# Patient Record
Sex: MALE | Race: WHITE | NOT HISPANIC OR LATINO | Employment: FULL TIME | URBAN - NONMETROPOLITAN AREA
[De-identification: names, ages, dates, MRNs, and addresses within clinical notes are randomized per-mention and may not be internally consistent; named-entity substitution may affect disease eponyms.]

---

## 2017-02-27 RX ORDER — OMEPRAZOLE 40 MG/1
CAPSULE, DELAYED RELEASE ORAL
Qty: 90 CAPSULE | Refills: 3 | Status: SHIPPED | OUTPATIENT
Start: 2017-02-27 | End: 2018-02-16 | Stop reason: SDUPTHER

## 2018-02-19 RX ORDER — OMEPRAZOLE 40 MG/1
CAPSULE, DELAYED RELEASE ORAL
Qty: 30 CAPSULE | Refills: 11 | Status: SHIPPED | OUTPATIENT
Start: 2018-02-19 | End: 2019-01-16

## 2019-01-16 RX ORDER — OMEPRAZOLE 40 MG/1
40 CAPSULE, DELAYED RELEASE ORAL DAILY
Qty: 30 CAPSULE | Refills: 11 | Status: SHIPPED | OUTPATIENT
Start: 2019-01-16 | End: 2020-01-15

## 2019-09-05 ENCOUNTER — APPOINTMENT (OUTPATIENT)
Dept: LAB | Facility: HOSPITAL | Age: 49
End: 2019-09-05
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
## 2019-09-05 DIAGNOSIS — Z00.00 GENERAL MEDICAL EXAM: Primary | ICD-10-CM

## 2019-09-05 DIAGNOSIS — R53.81 MALAISE AND FATIGUE: Primary | ICD-10-CM

## 2019-09-05 DIAGNOSIS — R53.83 MALAISE AND FATIGUE: Primary | ICD-10-CM

## 2019-09-05 PROCEDURE — 86618 LYME DISEASE ANTIBODY: CPT | Performed by: FAMILY MEDICINE

## 2019-09-05 PROCEDURE — 84443 ASSAY THYROID STIM HORMONE: CPT | Performed by: FAMILY MEDICINE

## 2019-09-05 PROCEDURE — 80053 COMPREHEN METABOLIC PANEL: CPT | Performed by: FAMILY MEDICINE

## 2019-09-05 PROCEDURE — 80061 LIPID PANEL: CPT | Performed by: FAMILY MEDICINE

## 2019-09-05 PROCEDURE — 86666 EHRLICHIA ANTIBODY: CPT | Performed by: FAMILY MEDICINE

## 2019-09-05 PROCEDURE — 85027 COMPLETE CBC AUTOMATED: CPT | Performed by: FAMILY MEDICINE

## 2019-09-05 PROCEDURE — 86757 RICKETTSIA ANTIBODY: CPT | Performed by: FAMILY MEDICINE

## 2019-09-05 RX ORDER — DOXYCYCLINE 100 MG/1
100 CAPSULE ORAL 2 TIMES DAILY
Qty: 30 CAPSULE | Refills: 0 | Status: SHIPPED | OUTPATIENT
Start: 2019-09-05 | End: 2019-10-03

## 2019-09-06 LAB
ALBUMIN SERPL-MCNC: 4.4 G/DL (ref 3.5–5.2)
ALBUMIN/GLOB SERPL: 1.6 G/DL
ALP SERPL-CCNC: 53 U/L (ref 39–117)
ALT SERPL W P-5'-P-CCNC: 20 U/L (ref 1–41)
ANION GAP SERPL CALCULATED.3IONS-SCNC: 12.9 MMOL/L (ref 5–15)
AST SERPL-CCNC: 23 U/L (ref 1–40)
BILIRUB SERPL-MCNC: 0.8 MG/DL (ref 0.2–1.2)
BUN BLD-MCNC: 13 MG/DL (ref 6–20)
BUN/CREAT SERPL: 15.7 (ref 7–25)
CALCIUM SPEC-SCNC: 9.3 MG/DL (ref 8.6–10.5)
CHLORIDE SERPL-SCNC: 104 MMOL/L (ref 98–107)
CHOLEST SERPL-MCNC: 160 MG/DL (ref 0–200)
CO2 SERPL-SCNC: 23.1 MMOL/L (ref 22–29)
CREAT BLD-MCNC: 0.83 MG/DL (ref 0.76–1.27)
DEPRECATED RDW RBC AUTO: 38.6 FL (ref 37–54)
ERYTHROCYTE [DISTWIDTH] IN BLOOD BY AUTOMATED COUNT: 12.6 % (ref 12.3–15.4)
GFR SERPL CREATININE-BSD FRML MDRD: 99 ML/MIN/1.73
GLOBULIN UR ELPH-MCNC: 2.7 GM/DL
GLUCOSE BLD-MCNC: 78 MG/DL (ref 65–99)
HCT VFR BLD AUTO: 51 % (ref 37.5–51)
HDLC SERPL-MCNC: 28 MG/DL (ref 40–60)
HGB BLD-MCNC: 17.1 G/DL (ref 13–17.7)
LDLC SERPL CALC-MCNC: 103 MG/DL (ref 0–100)
LDLC/HDLC SERPL: 3.66 {RATIO}
MCH RBC QN AUTO: 28.5 PG (ref 26.6–33)
MCHC RBC AUTO-ENTMCNC: 33.5 G/DL (ref 31.5–35.7)
MCV RBC AUTO: 85 FL (ref 79–97)
PLATELET # BLD AUTO: 195 10*3/MM3 (ref 140–450)
PMV BLD AUTO: 12.3 FL (ref 6–12)
POTASSIUM BLD-SCNC: 4.6 MMOL/L (ref 3.5–5.2)
PROT SERPL-MCNC: 7.1 G/DL (ref 6–8.5)
RBC # BLD AUTO: 6 10*6/MM3 (ref 4.14–5.8)
SODIUM BLD-SCNC: 140 MMOL/L (ref 136–145)
TRIGL SERPL-MCNC: 147 MG/DL (ref 0–150)
TSH SERPL DL<=0.05 MIU/L-ACNC: 1.51 UIU/ML (ref 0.27–4.2)
VLDLC SERPL-MCNC: 29.4 MG/DL (ref 5–40)
WBC NRBC COR # BLD: 6.77 10*3/MM3 (ref 3.4–10.8)

## 2019-09-07 LAB — B BURGDOR IGG+IGM SER-ACNC: <0.91 ISR (ref 0–0.9)

## 2019-09-09 DIAGNOSIS — R53.83 MALAISE AND FATIGUE: Primary | ICD-10-CM

## 2019-09-09 DIAGNOSIS — R53.81 MALAISE AND FATIGUE: Primary | ICD-10-CM

## 2019-09-09 PROCEDURE — 93010 ELECTROCARDIOGRAM REPORT: CPT | Performed by: INTERNAL MEDICINE

## 2019-09-10 ENCOUNTER — APPOINTMENT (OUTPATIENT)
Dept: GENERAL RADIOLOGY | Facility: HOSPITAL | Age: 49
End: 2019-09-10

## 2019-09-10 ENCOUNTER — APPOINTMENT (OUTPATIENT)
Dept: CT IMAGING | Facility: HOSPITAL | Age: 49
End: 2019-09-10

## 2019-09-10 ENCOUNTER — HOSPITAL ENCOUNTER (EMERGENCY)
Facility: HOSPITAL | Age: 49
Discharge: HOME | End: 2019-09-11
Attending: EMERGENCY MEDICINE | Admitting: EMERGENCY MEDICINE

## 2019-09-10 DIAGNOSIS — R07.9 CHEST PAIN, UNSPECIFIED TYPE: Primary | ICD-10-CM

## 2019-09-10 DIAGNOSIS — R42 DIZZINESS: ICD-10-CM

## 2019-09-10 LAB
ALBUMIN SERPL-MCNC: 4.2 G/DL (ref 3.5–5.2)
ALBUMIN/GLOB SERPL: 1.6 G/DL
ALP SERPL-CCNC: 53 U/L (ref 39–117)
ALT SERPL W P-5'-P-CCNC: 19 U/L (ref 1–41)
ANION GAP SERPL CALCULATED.3IONS-SCNC: 12 MMOL/L (ref 5–15)
AST SERPL-CCNC: 14 U/L (ref 1–40)
BASOPHILS # BLD AUTO: 0.05 10*3/MM3 (ref 0–0.2)
BASOPHILS NFR BLD AUTO: 0.5 % (ref 0–1.5)
BILIRUB SERPL-MCNC: 0.6 MG/DL (ref 0.2–1.2)
BUN BLD-MCNC: 20 MG/DL (ref 6–20)
BUN/CREAT SERPL: 21.5 (ref 7–25)
CALCIUM SPEC-SCNC: 9.6 MG/DL (ref 8.6–10.5)
CHLORIDE SERPL-SCNC: 103 MMOL/L (ref 98–107)
CO2 SERPL-SCNC: 25 MMOL/L (ref 22–29)
CREAT BLD-MCNC: 0.93 MG/DL (ref 0.76–1.27)
DEPRECATED RDW RBC AUTO: 37.8 FL (ref 37–54)
EOSINOPHIL # BLD AUTO: 0.07 10*3/MM3 (ref 0–0.4)
EOSINOPHIL NFR BLD AUTO: 0.7 % (ref 0.3–6.2)
ERYTHROCYTE [DISTWIDTH] IN BLOOD BY AUTOMATED COUNT: 12.9 % (ref 12.3–15.4)
GFR SERPL CREATININE-BSD FRML MDRD: 87 ML/MIN/1.73
GLOBULIN UR ELPH-MCNC: 2.7 GM/DL
GLUCOSE BLD-MCNC: 124 MG/DL (ref 65–99)
HCT VFR BLD AUTO: 44.8 % (ref 37.5–51)
HGB BLD-MCNC: 15.4 G/DL (ref 13–17.7)
IMM GRANULOCYTES # BLD AUTO: 0.02 10*3/MM3 (ref 0–0.05)
IMM GRANULOCYTES NFR BLD AUTO: 0.2 % (ref 0–0.5)
LYMPHOCYTES # BLD AUTO: 2.1 10*3/MM3 (ref 0.7–3.1)
LYMPHOCYTES NFR BLD AUTO: 21.3 % (ref 19.6–45.3)
MCH RBC QN AUTO: 27.9 PG (ref 26.6–33)
MCHC RBC AUTO-ENTMCNC: 34.4 G/DL (ref 31.5–35.7)
MCV RBC AUTO: 81.2 FL (ref 79–97)
MONOCYTES # BLD AUTO: 0.8 10*3/MM3 (ref 0.1–0.9)
MONOCYTES NFR BLD AUTO: 8.1 % (ref 5–12)
NEUTROPHILS # BLD AUTO: 6.81 10*3/MM3 (ref 1.7–7)
NEUTROPHILS NFR BLD AUTO: 69.2 % (ref 42.7–76)
NRBC BLD AUTO-RTO: 0 /100 WBC (ref 0–0.2)
NT-PROBNP SERPL-MCNC: 51.5 PG/ML (ref 5–450)
PLATELET # BLD AUTO: 219 10*3/MM3 (ref 140–450)
PMV BLD AUTO: 10.5 FL (ref 6–12)
POTASSIUM BLD-SCNC: 3.9 MMOL/L (ref 3.5–5.2)
PROT SERPL-MCNC: 6.9 G/DL (ref 6–8.5)
R RICKETTSI IGG SER QL IA: NEGATIVE
R RICKETTSI IGM TITR SER: 0.8 INDEX (ref 0–0.89)
RBC # BLD AUTO: 5.52 10*6/MM3 (ref 4.14–5.8)
SODIUM BLD-SCNC: 140 MMOL/L (ref 136–145)
TROPONIN T SERPL-MCNC: <0.01 NG/ML (ref 0–0.03)
WBC NRBC COR # BLD: 9.85 10*3/MM3 (ref 3.4–10.8)

## 2019-09-10 PROCEDURE — 93005 ELECTROCARDIOGRAM TRACING: CPT | Performed by: EMERGENCY MEDICINE

## 2019-09-10 PROCEDURE — 85379 FIBRIN DEGRADATION QUANT: CPT | Performed by: EMERGENCY MEDICINE

## 2019-09-10 PROCEDURE — 84484 ASSAY OF TROPONIN QUANT: CPT | Performed by: EMERGENCY MEDICINE

## 2019-09-10 PROCEDURE — 71046 X-RAY EXAM CHEST 2 VIEWS: CPT

## 2019-09-10 PROCEDURE — 85025 COMPLETE CBC W/AUTO DIFF WBC: CPT | Performed by: EMERGENCY MEDICINE

## 2019-09-10 PROCEDURE — 83880 ASSAY OF NATRIURETIC PEPTIDE: CPT | Performed by: EMERGENCY MEDICINE

## 2019-09-10 PROCEDURE — 70450 CT HEAD/BRAIN W/O DYE: CPT

## 2019-09-10 PROCEDURE — 80053 COMPREHEN METABOLIC PANEL: CPT | Performed by: EMERGENCY MEDICINE

## 2019-09-10 PROCEDURE — 99284 EMERGENCY DEPT VISIT MOD MDM: CPT

## 2019-09-10 PROCEDURE — 93010 ELECTROCARDIOGRAM REPORT: CPT | Performed by: INTERNAL MEDICINE

## 2019-09-10 RX ORDER — SODIUM CHLORIDE 0.9 % (FLUSH) 0.9 %
10 SYRINGE (ML) INJECTION AS NEEDED
Status: DISCONTINUED | OUTPATIENT
Start: 2019-09-10 | End: 2019-09-11 | Stop reason: HOSPADM

## 2019-09-11 VITALS
TEMPERATURE: 97.8 F | HEART RATE: 60 BPM | OXYGEN SATURATION: 97 % | BODY MASS INDEX: 28.23 KG/M2 | WEIGHT: 220 LBS | DIASTOLIC BLOOD PRESSURE: 79 MMHG | SYSTOLIC BLOOD PRESSURE: 139 MMHG | HEIGHT: 74 IN | RESPIRATION RATE: 18 BRPM

## 2019-09-11 LAB
A PHAGOCYTOPH IGM TITR SER IF: NEGATIVE {TITER}
CONV HGE IGG TITER: NEGATIVE
D-DIMER, QUANTITATIVE (MAD,POW, STR): 380 NG/ML (FEU) (ref 0–470)
E CHAFFEENSIS IGG TITR SER IF: NEGATIVE {TITER}
E. CHAFFEENSIS (HME) IGM TITER: NEGATIVE
HOLD SPECIMEN: NORMAL
HOLD SPECIMEN: NORMAL
WHOLE BLOOD HOLD SPECIMEN: NORMAL
WHOLE BLOOD HOLD SPECIMEN: NORMAL

## 2019-09-11 NOTE — DISCHARGE INSTRUCTIONS
Follow-up with Dr. Rogers or Dr. Richardson for further evaluation management and cardiac stress testing.  Continue current medications.  Continue an aspirin daily.  Return with any new or worsening symptoms or any concerns.

## 2019-09-11 NOTE — ED PROVIDER NOTES
Subjective   Patient 49-year-old male comes in today with complaints of chest tightness heaviness and some dizziness.  Patient states he was driving and he felt like his vision was getting tunnel in nature lasting seconds.  Patient had some lightheaded dizziness denied to while watching TV.  Patient has chest pain in the midline the right side of his chest going up to his neck.  Patient states he has had the symptoms for 5 days, has been seen by his primary care doctor, Dr. Quintanilla who told him to come immediately to the emergency department should the symptoms get worse.  So patient presented tonight for the symptoms for evaluation.  Patient has had no nausea vomiting.  There is been no shortness of breath.  Patient is a long-term smoker.  Patient had cardiac work-up approximately 3 years ago.  There is no history of aneurysm, his past medical history is incorrect in this nature.  CT scan, his latest showed no evidence of aneurysm.  No recent fevers or chills.  There is some question of tickborne illness for which the patient is currently on doxycycline.            Review of Systems   Constitutional: Positive for fatigue. Negative for appetite change, chills and fever.   HENT: Negative.  Negative for congestion.    Eyes: Positive for visual disturbance. Negative for photophobia.   Respiratory: Positive for shortness of breath. Negative for cough and chest tightness.    Cardiovascular: Positive for chest pain. Negative for palpitations.   Gastrointestinal: Negative.  Negative for abdominal pain, constipation, diarrhea, nausea and vomiting.   Endocrine: Negative.    Genitourinary: Negative.  Negative for decreased urine volume, dysuria, flank pain and hematuria.   Musculoskeletal: Negative.  Negative for arthralgias, back pain, myalgias, neck pain and neck stiffness.   Skin: Negative.  Negative for pallor.   Neurological: Positive for dizziness. Negative for syncope, weakness, light-headedness, numbness and headaches.    Psychiatric/Behavioral: Negative.  Negative for confusion and suicidal ideas. The patient is not nervous/anxious.    All other systems reviewed and are negative.      Past Medical History:   Diagnosis Date   • Aneurysm of thoracic aorta (CMS/HCC)    • Bicuspid aortic valve     unable to exclude      • Chest pain    • GERD (gastroesophageal reflux disease)    • Heavy tobacco smoker    • Screening for hyperlipidemia        No Known Allergies    Past Surgical History:   Procedure Laterality Date   • KIDNEY STONE SURGERY         History reviewed. No pertinent family history.    Social History     Socioeconomic History   • Marital status:      Spouse name: Not on file   • Number of children: Not on file   • Years of education: Not on file   • Highest education level: Not on file   Tobacco Use   • Smoking status: Current Every Day Smoker     Packs/day: 1.50     Types: Cigarettes   Substance and Sexual Activity   • Alcohol use: No     Frequency: Never   • Drug use: No   • Sexual activity: Defer           Objective   Physical Exam   Constitutional: He is oriented to person, place, and time. He appears well-developed and well-nourished.  Non-toxic appearance. He does not appear ill. No distress.   HENT:   Head: Normocephalic and atraumatic.   Eyes: Conjunctivae and EOM are normal.   Neck: Normal range of motion. Neck supple. No JVD present.   No meningismus.  No bruit.   Cardiovascular: Normal rate, regular rhythm, normal heart sounds and intact distal pulses. Exam reveals no gallop and no friction rub.   No murmur heard.  Pulmonary/Chest: Effort normal. No respiratory distress. He has no wheezes. He has no rales. He exhibits no tenderness.   Abdominal: Soft. Bowel sounds are normal. He exhibits no distension and no mass. There is no tenderness. There is no rebound and no guarding.   Musculoskeletal: Normal range of motion.   Lymphadenopathy:     He has no cervical adenopathy.   Neurological: He is alert and  oriented to person, place, and time.   Skin: Skin is warm and dry. Capillary refill takes less than 2 seconds.   Psychiatric: He has a normal mood and affect. His behavior is normal. Judgment and thought content normal.   Nursing note and vitals reviewed.      ECG 12 Lead    Date/Time: 9/10/2019 10:51 PM  Performed by: Emmanuel Cervantes MD  Authorized by: Emmanuel Cervantes MD   Interpreted by physician  Comparison: compared with previous ECG   Rhythm: sinus rhythm  Rate: normal  BPM: 77  QRS axis: normal  Conduction: conduction normal  Clinical impression: normal ECG                 ED Course      Labs Reviewed   COMPREHENSIVE METABOLIC PANEL - Abnormal; Notable for the following components:       Result Value    Glucose 124 (*)     All other components within normal limits    Narrative:     GFR Normal >60  Chronic Kidney Disease <60  Kidney Failure <15   BNP (IN-HOUSE) - Normal    Narrative:     Among patients with dyspnea, NT-proBNP is highly sensitive for the detection of acute congestive heart failure. In addition NT-proBNP of <300 pg/ml effectively rules out acute congestive heart failure with 99% negative predictive value.   TROPONIN (IN-HOUSE) - Normal    Narrative:     Troponin T Reference Range:  <= 0.03 ng/mL-   Negative for AMI  >0.03 ng/mL-     Abnormal for myocardial necrosis.  Clinicians would have to utilize clinical acumen, EKG, Troponin and serial changes to determine if it is an Acute Myocardial Infarction or myocardial injury due to an underlying chronic condition.    CBC WITH AUTO DIFFERENTIAL - Normal   D-DIMER, QUANTITATIVE - Normal    Narrative:     Dimer values <500 ng/ml FEU are FDA approved as aid in diagnosis of deep venous thrombosis and pulmonary embolism.  This test should not be used in an exclusion strategy with pretest probability alone.    A recent guideline regarding diagnosis for pulmonary thromboembolism recommends an adjusted exclusion criterion of age x 10 ng/ml FEU for  patients >50 years of age ([employer] Intern Med 2015; 163: 701-711).   RAINBOW DRAW    Narrative:     The following orders were created for panel order [place] Draw.  Procedure                               Abnormality         Status                     ---------                               -----------         ------                     Light Blue Top[[id number]]                                   Final result               Green Top (Gel)[[id number]]                                  Final result               Lavender Top[[id number]]                                     Final result               Gold Top - SST[[id number]]                                   Final result                 Please view results for these tests on the individual orders.   CBC AND DIFFERENTIAL    Narrative:     The following orders were created for panel order CBC & Differential.  Procedure                               Abnormality         Status                     ---------                               -----------         ------                     CBC Auto Differential[[id number]]        Normal              Final result                 Please view results for these tests on the individual orders.   LIGHT BLUE TOP   GREEN TOP   LAVENDER TOP   GOLD TOP - SST       CT Head Without Contrast   Final Result   CONCLUSION:   Normal nonenhanced CT of the brain      [id number]      Electronically signed by:  Farhad Richards MD  9/11/2019 12:06 AM   CDT Workstation: [employer]      XR Chest 2 View   Final Result   CONCLUSION:   No Acute Disease      [id number]      Electronically signed by:  Farhad Richards MD  9/11/2019 12:00 AM   CDT Workstation: [employer]        Patient with nonspecific chest pain over last 2 weeks.  Patient with dizziness this evening.  Negative markers with normal EKG negative CT scan.  No objective findings at this time.  Patient will follow with a stress test with cardiology.  Referral given for the same.  Plan scheduling tomorrow.             [facility]    Final diagnoses:   Chest pain, unspecified type   Dizziness              Emmanuel Cervantes MD  09/11/19 0051

## 2019-09-13 PROBLEM — R07.2 PRECORDIAL CHEST PAIN: Status: ACTIVE | Noted: 2019-09-13

## 2019-09-13 NOTE — PROGRESS NOTES
"Subjective:     Chest Pain (chief complaint)    Patient Care Team:  Brian Quintanilla MD as PCP - General    History of Present Illness  The patient is a 49-year-old male who presents as referral from Emmanuel Cervantes MD following presentation to [facility] emergency department on 9/10/2019 regarding complaints of chest discomfort/dizziness which is described as below.    During the course of emergency department, the patient underwent EKG, troponins (negative), CXR and CT head all of which were unremarkable.     Chest Pain:  Location: Anterior chest radiating into the LUE  Duration: Intermittent over \"several weeks\"  Quality: Dull ache  Intensity: Moderate  Precipitating factors: None identified  Aggravating factors: None identified  Alleviating factors: None identififed  Associated symptoms: Near syncope; dizziness; dyspnea    Dizziness/near syncope has occurred intermittently over this same period of time, while walking, position changes and driving. He describes this as an \"off balanced\" \"feel like I'm going to pass out\" feeling.     The patient was last evaluated in cardiology per Dr. Rogers on 2/11/2016 regarding noncardiac chest pain following a low risk study treadmill stress test.  In addition TAA was excluded by CT as well as no evidence of bicuspid aortic valve with transthoracic echocardiogram.  The latest limited transthoracic echocardiogram as well as stress test has been forwarded to medical records for scanning.    Review of Systems   Constitution: Positive for malaise/fatigue.   Cardiovascular: Positive for chest pain, dyspnea on exertion and near-syncope. Negative for claudication, cyanosis, irregular heartbeat, leg swelling, orthopnea, palpitations, paroxysmal nocturnal dyspnea and syncope.   Musculoskeletal: Negative for falls.   Gastrointestinal: Negative for bloating.   Neurological: Positive for dizziness and light-headedness.     Past Medical History:   Diagnosis Date   • " "Chest pain    • GERD (gastroesophageal reflux disease)    • Heavy tobacco smoker    • Screening for hyperlipidemia    ,   Past Surgical History:   Procedure Laterality Date   • KIDNEY STONE SURGERY     ,   Family History   Problem Relation Age of Onset   • Hypertension Father    ,   Social History     Socioeconomic History   • Marital status:      Spouse name: Not on file   • Number of children: Not on file   • Years of education: Not on file   • Highest education level: Not on file   Tobacco Use   • Smoking status: Current Every Day Smoker     Packs/day: 1.50     Types: Cigarettes   • Smokeless tobacco: Never Used   Substance and Sexual Activity   • Alcohol use: No     Frequency: Never   • Drug use: No   • Sexual activity: Defer     Patient has no known allergies.    Current Outpatient Medications   Medication Sig Dispense Refill   • aspirin 325 MG tablet Take 325 mg by mouth Every Night.     • omeprazole (priLOSEC) 40 MG capsule Take 1 capsule by mouth Daily. 30 capsule 11   • doxycycline (MONODOX) 100 MG capsule Take 1 capsule by mouth 2 (Two) Times a Day. 30 capsule 0     No current facility-administered medications for this visit.      Objective:     Vitals:    09/16/19 0903   BP: 122/74   BP Location: Left arm   Patient Position: Sitting   Cuff Size: Adult   Pulse: 77   SpO2: 98%   Weight: 97.2 kg (214 lb 3.2 oz)   Height: 188 cm (74\")     Wt Readings from Last 3 Encounters:   09/16/19 97.2 kg (214 lb 3.2 oz)   09/10/19 99.8 kg (220 lb)       Physical Exam   Constitutional: He is oriented to person, place, and time. He appears well-developed and well-nourished. No distress.   HENT:   Head: Normocephalic and atraumatic.   Eyes: No scleral icterus.   Neck: No JVD present.   Cardiovascular: Normal rate, regular rhythm, S1 normal, S2 normal, normal heart sounds and intact distal pulses. Exam reveals no gallop.   No murmur heard.  Pulses:       Carotid pulses are on the right side with bruit.       Radial " pulses are 2+ on the right side, and 2+ on the left side.   Pulmonary/Chest: Effort normal and breath sounds normal. No respiratory distress. He has no wheezes. He has no rales.   Abdominal: Soft. He exhibits no distension. There is no tenderness.   Musculoskeletal: He exhibits no edema or tenderness.   Neurological: He is alert and oriented to person, place, and time.   Skin: Skin is warm and dry. He is not diaphoretic.   Psychiatric: He has a normal mood and affect. His behavior is normal. Judgment and thought content normal.   Vitals reviewed.    Data Reviewed:  Electrocardiogram:       @  Results for orders placed in visit on 07/07/15   Echo - Converted    [facility]      PT NAME:  JEANETH KENDRICK  [facility]  MR NUMBER:  [id number]                      :  1970  ECHOCARDIOGRAM        MEDICAL OFFICE BUILDING  DATE:  2015     PRIMARY CARE PHYSICIAN:    ORDERING DOCTOR:                             MANISH MCCALL MD     REASON FOR ECHO:  Chest    INTERPRETING  pain                       CARDIOLOGIST:  MANISH MCCALL MD     PRIOR ECHO:  No            DATE OF PRIOR ECHO:     PATIENT PROCEDURE #:       LOCATION OF STUDY:  H                       and V                               QUANTITATIVE MEASUREMENTS     LA =    34     (<40)     LA/AO Ratio  0.8  Ao =    40     (<37)     AoV Open     21   (>15)  IVS=    11     (<11)     LVPW         10   (<11)  LVEDd=  51     (38-56)   LVEDs        36   (22-40)  RV=     24     (<26)     LVOT         2.3  EF=     55-60  %         %F.S.             (24-46)     Max. Pulmonary Gradient  4   mmHg  RV Systolic Pressure     17  mmHg     MV Area           4.7  cm2  Mean MV Gradient  2    mmHg  MV p 1/2 t        47   msec     EMILY (Doppler)     3.0  cm2  EMILY (Planimeter)       cm2  Max. AV Gradient  8    mmHg  Mean AV Gradient  3    mmHg  AI p 1/2 t             msec        DESCRIPTION:  A routine full  transthoracic echocardiogram was performed  with color flow Doppler. The study is technically adequate.     FINDINGS:  The left ventricle is normal in internal size and shape with  normal function. Ejection fraction 60%. The left ventricular wall  motion is normal.     The left ventricle is normal in thickness.       Diastolic function: E/A 0.7, E 54 cm/sec, E prime 12 cm/sec. Grade 1A  diastolic dysfunction.     The right ventricle is normal in size and shape with normal systolic  function. Interventricular septum is normal. No evidence of right  ventricular pressure or volume overload.     The left and right atrium are normal in diameter. Interatrial septum is  intact by color flow Doppler.     Left ventricular outflow tract, sinuses of Valsalva, sinotubular  junction, and tubular ascending aorta measured normal in size on 2D  echo. However, M-mode echo through the aortic root reveals dilation at  4 cm. Suprasternal views of the arch were obtained and were  satisfactory. Mid arch normal in size without abnormality in color flow  Doppler to suggest coarctation. Normal early diastolic flow reversal.     The right ventricle outflow tract is normal in diameter.     The mitral valve is structurally normal.     The aortic valve appears to be normal in the short axis views. It is  difficult to exclude a bicuspid aortic valve. There is normal leaflet  excursion.     Tricuspid valve is normal. Trace tricuspid regurgitation. Pulmonary  artery systolic pressure 17 mmHg.     Pulmonic valve is not well seen.     The IVC is normal in size with appropriate collapse showing right  atrial pressure is 0-5 mmHg.     Pericardium: No pericardial effusion.     CONCLUSIONS:  1. Normal left ventricular function with an estimated ejection fraction        of 55% to 60% without regional wall motion abnormalities.  2. Normal right ventricular size with normal function.  3. Grade 1A diastolic dysfunction.  4. Unable to exclude bicuspid aortic  valve.  5. Dilated aortic root at 4 cm.  6. No significant valvular regurgitation or stenosis.        MANISH MCCALL MD  Electronically Signed  07/09/2015 16:16:19  By MANISH MCCALL MD     SH/cla  Dictated:   07/09/2015 1229  Transcribed:   07/09/2015 1446     Page #page     Xr Chest 2 View    Result Date: 9/11/2019  CONCLUSION: No Acute Disease 99051 Electronically signed by:  Farhad Richards MD  9/11/2019 12:00 AM CDT Workstation: [employer]    Ct Head Without Contrast    Result Date: 9/11/2019  CONCLUSION: Normal nonenhanced CT of the brain [id number] Electronically signed by:  Farhad Richards MD  9/11/2019 12:06 AM CDT Workstation: [employer]    Labs:  Results from last 7 days   Lab Units 09/10/19  2318   WBC 10*3/mm3 9.85   HEMOGLOBIN g/dL 15.4   PLATELETS 10*3/mm3 219     Lab Results   Component Value Date    GLUCOSE 124 (H) 09/10/2019    CALCIUM 9.6 09/10/2019     09/10/2019    K 3.9 09/10/2019    CO2 25.0 09/10/2019     09/10/2019    BUN 20 09/10/2019    CREATININE 0.93 09/10/2019    EGFRIFNONA 87 09/10/2019    BCR 21.5 09/10/2019    ANIONGAP 12.0 09/10/2019     Lab Results   Component Value Date    ALT 19 09/10/2019     Estimated Creatinine Clearance: 133.5 mL/min (by C-G formula based on SCr of 0.93 mg/dL).    Lab Results   Component Value Date    CHOL 160 09/05/2019    CHLPL 175 07/01/2015    TRIG 147 09/05/2019    HDL 28 (L) 09/05/2019     (H) 09/05/2019     Lab Results   Component Value Date    TSH 1.510 09/05/2019     Lab Results   Component Value Date    PROBNP 51.5 09/10/2019     The following portions of the patient's history were reviewed and updated as appropriate: allergies, current medications, problem list,  past medical history, surgical history, family history and social history.    Recent images independently reviewed.    Available laboratory values reviewed.      Assessment:      Diagnosis Plan   1. Precordial chest pain  Adult Transthoracic Echo Complete W/ Cont if  Necessary Per Protocol    Treadmill Stress Test   2. Dizziness  Tilt Table    Holter Monitor - 72 Hour Up To 21 Days   3. Near syncope  Tilt Table    Holter Monitor - 72 Hour Up To 21 Days   4. Dyspnea on exertion  Adult Transthoracic Echo Complete W/ Cont if Necessary Per Protocol   5. Bruit of right carotid artery  Duplex Carotid Ultrasound CAR     The patient has chest pain syndrome with pain complaints that are best characterized as typical.   The patient is moderate risk.    An ischemia evaluation is indicated.    The patient is able to exercise.    EKG is normal.  Risks/Benefits discussed.   A hand out was provided on the type of stress test and how to handle additional chest pain complaints. Questions answered.   I have asked the patient to call 911 or be seen in the ED for further CP complaints.   Will plan on further evaluation with:  TST  TTE    In addition the patient presents with dizziness/near syncopal episodes for which underlying neurocardiogenic syncope/arrhythmias cannot entirely be ruled out.  We will plan on further evaluation with:  Head up tilt table  Holter monitor    There were clinical findings suggestive of right carotid bruit therefore we will proceed with:  Duplex carotid ultrasound    Future Appointments       Provider Department Center    9/26/2019 1:00 PM MGW HVC CARD STRESS [facility] [place]    9/26/2019 2:00 PM MAD C ECHO ROOM 1 [facility]ART [facility] [place]    9/26/2019 2:45 PM EKG/HOLTER CARD [facility] HEART AND [facility] [place]    10/3/2019 11:30 AM MAD HVC VAS ROOM [facility] [place]    10/3/2019 1:00 PM MAD STRESS LAB 1 [facility] CARDIO STRESS [facility]    10/21/2019 3:30 PM Gwen Canada APRN [facility] CARDIOLOGY             This document has been electronically signed by VICKIE Holcomb on September 16,  2019 10:23 AM

## 2019-09-16 ENCOUNTER — OFFICE VISIT (OUTPATIENT)
Dept: CARDIOLOGY | Facility: CLINIC | Age: 49
End: 2019-09-16

## 2019-09-16 VITALS
DIASTOLIC BLOOD PRESSURE: 74 MMHG | SYSTOLIC BLOOD PRESSURE: 122 MMHG | OXYGEN SATURATION: 98 % | HEART RATE: 77 BPM | WEIGHT: 214.2 LBS | BODY MASS INDEX: 27.49 KG/M2 | HEIGHT: 74 IN

## 2019-09-16 DIAGNOSIS — R06.09 DYSPNEA ON EXERTION: ICD-10-CM

## 2019-09-16 DIAGNOSIS — R09.89 BRUIT OF RIGHT CAROTID ARTERY: ICD-10-CM

## 2019-09-16 DIAGNOSIS — R42 DIZZINESS: ICD-10-CM

## 2019-09-16 DIAGNOSIS — R07.2 PRECORDIAL CHEST PAIN: Primary | ICD-10-CM

## 2019-09-16 DIAGNOSIS — R55 NEAR SYNCOPE: ICD-10-CM

## 2019-09-16 PROCEDURE — 99215 OFFICE O/P EST HI 40 MIN: CPT | Performed by: NURSE PRACTITIONER

## 2019-09-16 RX ORDER — ASPIRIN 325 MG
325 TABLET ORAL NIGHTLY
COMMUNITY

## 2019-10-03 ENCOUNTER — HOSPITAL ENCOUNTER (OUTPATIENT)
Dept: CARDIOLOGY | Facility: HOSPITAL | Age: 49
Discharge: HOME | End: 2019-10-03
Admitting: NURSE PRACTITIONER

## 2019-10-03 DIAGNOSIS — R55 NEAR SYNCOPE: ICD-10-CM

## 2019-10-03 DIAGNOSIS — R42 DIZZINESS: ICD-10-CM

## 2019-10-03 LAB
MAXIMAL PREDICTED HEART RATE: 172 BPM
STRESS TARGET HR: 146 BPM

## 2019-10-03 PROCEDURE — 93660 TILT TABLE EVALUATION: CPT | Performed by: INTERNAL MEDICINE

## 2019-10-03 PROCEDURE — 93660 TILT TABLE EVALUATION: CPT

## 2019-10-14 DIAGNOSIS — R42 DIZZINESS: Primary | ICD-10-CM

## 2019-10-18 NOTE — PROGRESS NOTES
"Subjective:     Results    Patient Care Team:  Brian Quintanilla MD as PCP - General    History of Present Illness  The patient is a 49-year-old male who presents as referral from Emmanuel Cervantes MD following presentation to [facility] emergency department on 9/10/2019 regarding complaints of chest discomfort/dizziness which is described as below.    During the course of emergency department, the patient underwent EKG, troponins (negative), CXR and CT head all of which were unremarkable.     Chest Pain:  Location: Anterior chest radiating into the LUE  Duration: Intermittent over \"several weeks\"  Quality: Dull ache  Intensity: Moderate  Precipitating factors: None identified  Aggravating factors: None identified  Alleviating factors: None identififed  Associated symptoms: Near syncope; dizziness; dyspnea    Dizziness/near syncope has occurred intermittently over this same period of time, while walking, position changes and driving. He describes this as an \"off balanced\" \"feel like I'm going to pass out\" feeling.     The patient was last evaluated in cardiology per Dr. Rogers on 2/11/2016 regarding noncardiac chest pain following a low risk study treadmill stress test.  In addition TAA was excluded by CT as well as no evidence of bicuspid aortic valve with transthoracic echocardiogram.  The latest limited transthoracic echocardiogram as well as stress test has been forwarded to medical records for scanning.    10/21/2019  The patient returns to the office today for discussion of procedure results.  The patient indicates that he has an appointment with a neurologist in [place] as arranged by his primary care provider regarding his dizziness.  The patient is unaware of any symptoms related to snoring, sleep disturbances or daytime sleepiness.    Review of Systems   Constitution: Positive for malaise/fatigue.   Cardiovascular: Positive for chest pain, dyspnea on exertion and near-syncope. Negative " "for claudication, cyanosis, irregular heartbeat, leg swelling, orthopnea, palpitations, paroxysmal nocturnal dyspnea and syncope.   Respiratory: Negative for sleep disturbances due to breathing and snoring.    Musculoskeletal: Negative for falls.   Gastrointestinal: Negative for bloating.   Neurological: Positive for dizziness and light-headedness. Negative for excessive daytime sleepiness.     Past Medical History:   Diagnosis Date   • Chest pain    • GERD (gastroesophageal reflux disease)    • Heavy tobacco smoker    • Screening for hyperlipidemia    ,   Past Surgical History:   Procedure Laterality Date   • KIDNEY STONE SURGERY     ,   Family History   Problem Relation Age of Onset   • Hypertension Father    ,   Social History     Socioeconomic History   • Marital status:      Spouse name: Not on file   • Number of children: Not on file   • Years of education: Not on file   • Highest education level: Not on file   Tobacco Use   • Smoking status: Current Every Day Smoker     Packs/day: 1.50     Types: Cigarettes   • Smokeless tobacco: Never Used   Substance and Sexual Activity   • Alcohol use: No     Frequency: Never   • Drug use: No   • Sexual activity: Defer     Patient has no known allergies.    Current Outpatient Medications   Medication Sig Dispense Refill   • aspirin 325 MG tablet Take 325 mg by mouth Every Night.     • omeprazole (priLOSEC) 40 MG capsule Take 1 capsule by mouth Daily. 30 capsule 11     No current facility-administered medications for this visit.      Objective:     Vitals:    10/21/19 1512   BP: 128/80   BP Location: Left arm   Patient Position: Sitting   Cuff Size: Adult   Pulse: 77   SpO2: 99%   Weight: 94.3 kg (207 lb 12.8 oz)   Height: 188 cm (74\")     Wt Readings from Last 3 Encounters:   10/21/19 94.3 kg (207 lb 12.8 oz)   09/16/19 97.2 kg (214 lb 3.2 oz)   09/10/19 99.8 kg (220 lb)       Physical Exam   Constitutional: He is oriented to person, place, and time. He appears " well-developed and well-nourished. No distress.   HENT:   Head: Normocephalic and atraumatic.   Eyes: No scleral icterus.   Neck: No JVD present.   Cardiovascular: Normal rate, regular rhythm, S1 normal, S2 normal, normal heart sounds and intact distal pulses. Exam reveals no gallop.   No murmur heard.  Pulses:       Carotid pulses are on the right side with bruit.       Radial pulses are 2+ on the right side, and 2+ on the left side.   Pulmonary/Chest: Effort normal and breath sounds normal. No respiratory distress. He has no wheezes. He has no rales.   Abdominal: Soft. He exhibits no distension. There is no tenderness.   Musculoskeletal: He exhibits no edema or tenderness.   Neurological: He is alert and oriented to person, place, and time.   Skin: Skin is warm and dry. He is not diaphoretic.   Psychiatric: He has a normal mood and affect. His behavior is normal. Judgment and thought content normal.   Vitals reviewed.    Data Reviewed:  Electrocardiogram:       TST: 09/26/2019  Interpretation Summary   · The patient reached the end of the protocol and achieved the target heart rate.  · The patient experienced no angina during the stress test.  · Blood pressure demonstrated a normal response to stress. Heart rate demonstrated a normal response to stress. Overall, the patient's exercise capacity was normal  · The Duke Treadmill Score of 9 is consistent with a Low risk for ischemic heart disease.  · Findings consistent with a normal ECG stress test.        @  Results for orders placed during the hospital encounter of 09/26/19   Adult Transthoracic Echo Complete W/ Cont if Necessary Per Protocol    Narrative · Left ventricular wall thickness is consistent with mild concentric   hypertrophy.  · Estimated EF = 58%.  · Left ventricular systolic function is normal.  · Left ventricular diastolic dysfunction (grade I a) consistent with   impaired relaxation.  · Right ventricular cavity is borderline dilated. Normal systolic  function  · Mild dilation of the aortic root is present (4.0 cms)        ANAT: 10/03/2019  Interpretation Summary   Ambrose Sherman is a 49-year-old male who is being evaluated for dizziness  Procedure: Tilt table testing     The patient was tilted for a period of 46 minutes and 39 seconds at 70 degrees.  The resting heart rate was 77 bpm and the maximum heart rate noted was 96 bpm.  There were no episodes of bradycardia noted.  Baseline blood pressure was 123/79 and no episodes of symptomatic hypotension was noted.   EKG showed sinus rhythm with no cardiac arrhythmias noted.  No ST-T changes noted.      Impression: Negative tilt table test for cardiogenic syncope     Carotid Ulsd: 10/03/2019  Study Impression   • Right ICA Prox: Imaging of the right ICA indicates 0-49% stenosis.  • Right Vertebral: Antegrade flow is present.     • Left ICA Prox: Imaging of the left ICA indicates 0-49% stenosis.  • Left Vertebral: Antegrade flow present.     09/10/2019      Labs:      Lab Results   Component Value Date    GLUCOSE 124 (H) 09/10/2019    CALCIUM 9.6 09/10/2019     09/10/2019    K 3.9 09/10/2019    CO2 25.0 09/10/2019     09/10/2019    BUN 20 09/10/2019    CREATININE 0.93 09/10/2019    EGFRIFNONA 87 09/10/2019    BCR 21.5 09/10/2019    ANIONGAP 12.0 09/10/2019     Lab Results   Component Value Date    ALT 19 09/10/2019     CrCl cannot be calculated (Patient's most recent lab result is older than the maximum 30 days allowed.).    Lab Results   Component Value Date    CHOL 160 09/05/2019    CHLPL 175 07/01/2015    TRIG 147 09/05/2019    HDL 28 (L) 09/05/2019     (H) 09/05/2019     Lab Results   Component Value Date    TSH 1.510 09/05/2019     Lab Results   Component Value Date    PROBNP 51.5 09/10/2019     The following portions of the patient's history were reviewed and updated as appropriate: allergies, current medications, problem list,  past medical history, surgical history, family history and social  history.    Recent images independently reviewed.    Available laboratory values reviewed.    Assessment:      Diagnosis Plan   1. Chest pain syndrome     2. Postural dizziness with near syncope     3. Dilated aortic root (CMS/HCC)  CT Angiogram Chest With & Without Contrast     Discussion with the patient regarding results of treadmill stress test, transthoracic echocardiogram and head up tilt table for which he indicates information has been provided in a fashion of his understanding.  There has been a preliminary review of the Zio patch results per Dr. Dumont today (no formal interpretation had been acquired prior to today for reasons that have not been given).  Dr. Dumont indicates the presence of nocturnal bradycardia, episode of tachycardia, and the presence of premature atrial contractions (less than 1% burden).  Dr. Dumont indicates that there is no evidence of sustained arrhythmias of concern.    At this time, we will proceed with CT of the chest regarding dilated aortic root.  I will contact the patient by phone regarding results.    I have asked the patient to speak with his wife regarding significant snoring and sleep disturbances due to abnormal breathing pattern.  The patient or his wife will contact me regarding their discussion.  Consideration has been given to referral to sleep medicine.    Additional recommendations:  Activity: Approximately 150 minutes of moderate activity (such as walking program)  per week (20-30 minutes most days of the week)    Diet: Heart healthy foods - more fresh fruits and vegetables and whole grains; less red meat; more fish and poultry that is baked or grilled - not fried; less salt not to exceed 2000 mg daily - less processed food; No trans or saturated fat    Non Smoker    Recommend caffeine intake to no more than 400 mg/day with intake no later than 1 hour after lunch    Alcohol intake:   Up to 2 servings per day for men and 1 serving per day in women or  lightweight men     A standard drink is equal to 14.0 grams (0.6 ounces) of pure alcohol. Generally, this    amount of pure alcohol is found in  12 ounces of beer (5% alcohol content)   8 ounces of malt liquor (7% alcohol content)  5 ounces of wine (12% alcohol content)  1.5 ounces or a “shot” of 80-proof (40% alcohol content) distilled spirits or liquor (e.g., gin, rum, vodka, whiskey)    Weight management: Patient's Body mass index is 26.68 kg/m². BMI is above normal parameters. Recommendations include: educational material, exercise counseling and nutrition counseling  Stress management    *Disease risk for type 2 diabetes. Hypertension and cardiovascular disease discussed with the patient  * Increased waist circumference (greater than 35 inches for females and 40 inches for males) also can be a marker for increased risk, even in persons of normal weight - patient made aware of this information    20 minutes out of 30 minutes face to face spent in counseling/coordination of care as relates to presentation of chest pain syndrome with dietary and lifestyle modifications/procedures as outlined above.          Future Appointments       Provider Department Center    10/29/2019 2:30 PM ABEBA CT 1 [facility]        This document has been electronically signed by VICKIE Holcomb on October 21, 2019 3:31 PM

## 2019-10-21 ENCOUNTER — OFFICE VISIT (OUTPATIENT)
Dept: CARDIOLOGY | Facility: CLINIC | Age: 49
End: 2019-10-21

## 2019-10-21 VITALS
SYSTOLIC BLOOD PRESSURE: 128 MMHG | DIASTOLIC BLOOD PRESSURE: 80 MMHG | BODY MASS INDEX: 26.67 KG/M2 | OXYGEN SATURATION: 99 % | HEIGHT: 74 IN | HEART RATE: 77 BPM | WEIGHT: 207.8 LBS

## 2019-10-21 DIAGNOSIS — R07.9 CHEST PAIN SYNDROME: Primary | ICD-10-CM

## 2019-10-21 DIAGNOSIS — I77.810 DILATED AORTIC ROOT (HCC): ICD-10-CM

## 2019-10-21 DIAGNOSIS — R42 POSTURAL DIZZINESS WITH NEAR SYNCOPE: ICD-10-CM

## 2019-10-21 DIAGNOSIS — R55 POSTURAL DIZZINESS WITH NEAR SYNCOPE: ICD-10-CM

## 2019-10-21 PROCEDURE — 99214 OFFICE O/P EST MOD 30 MIN: CPT | Performed by: NURSE PRACTITIONER

## 2019-10-29 ENCOUNTER — HOSPITAL ENCOUNTER (OUTPATIENT)
Dept: CT IMAGING | Facility: HOSPITAL | Age: 49
Discharge: HOME | End: 2019-10-29
Admitting: NURSE PRACTITIONER

## 2019-10-29 ENCOUNTER — TELEPHONE (OUTPATIENT)
Dept: CARDIOLOGY | Facility: CLINIC | Age: 49
End: 2019-10-29

## 2019-10-29 PROCEDURE — 0 IOPAMIDOL PER 1 ML: Performed by: NURSE PRACTITIONER

## 2019-10-29 PROCEDURE — 71275 CT ANGIOGRAPHY CHEST: CPT

## 2019-10-29 RX ADMIN — IOPAMIDOL 90 ML: 755 INJECTION, SOLUTION INTRAVENOUS at 14:20

## 2019-10-29 NOTE — TELEPHONE ENCOUNTER
Patient returned phone call, let patient know per provider that his ct looked good. Patient expressed his understanding and didn't have any questions for me at the time.

## 2019-11-05 ENCOUNTER — LAB (OUTPATIENT)
Dept: LAB | Facility: HOSPITAL | Age: 49
End: 2019-11-05

## 2019-11-05 ENCOUNTER — TRANSCRIBE ORDERS (OUTPATIENT)
Dept: LAB | Facility: HOSPITAL | Age: 49
End: 2019-11-05

## 2019-11-05 DIAGNOSIS — R42 DIZZY: Primary | ICD-10-CM

## 2019-11-05 DIAGNOSIS — R42 DIZZY: ICD-10-CM

## 2019-11-05 LAB
FOLATE SERPL-MCNC: 2.95 NG/ML (ref 4.78–24.2)
VIT B12 BLD-MCNC: 399 PG/ML (ref 211–946)

## 2019-11-05 PROCEDURE — 82746 ASSAY OF FOLIC ACID SERUM: CPT | Performed by: PSYCHIATRY & NEUROLOGY

## 2019-11-05 PROCEDURE — 82607 VITAMIN B-12: CPT | Performed by: PSYCHIATRY & NEUROLOGY

## 2020-01-15 RX ORDER — OMEPRAZOLE 40 MG/1
40 CAPSULE, DELAYED RELEASE ORAL DAILY
Qty: 90 CAPSULE | Refills: 3 | Status: SHIPPED | OUTPATIENT
Start: 2020-01-15 | End: 2020-10-28 | Stop reason: SDUPTHER

## 2020-07-19 PROCEDURE — U0002 COVID-19 LAB TEST NON-CDC: HCPCS | Performed by: NURSE PRACTITIONER

## 2020-10-28 RX ORDER — OMEPRAZOLE 40 MG/1
40 CAPSULE, DELAYED RELEASE ORAL DAILY
Qty: 90 CAPSULE | Refills: 3 | Status: SHIPPED | OUTPATIENT
Start: 2020-10-28 | End: 2021-10-13 | Stop reason: SDUPTHER

## 2021-01-22 PROCEDURE — 87635 SARS-COV-2 COVID-19 AMP PRB: CPT | Performed by: NURSE PRACTITIONER

## 2021-09-03 ENCOUNTER — APPOINTMENT (OUTPATIENT)
Dept: VACCINE CLINIC | Facility: HOSPITAL | Age: 51
End: 2021-09-03

## 2021-09-10 ENCOUNTER — IMMUNIZATION (OUTPATIENT)
Dept: VACCINE CLINIC | Facility: HOSPITAL | Age: 51
End: 2021-09-10

## 2021-09-10 PROCEDURE — 91300 HC SARSCOV02 VAC 30MCG/0.3ML IM: CPT | Performed by: NURSE PRACTITIONER

## 2021-09-10 PROCEDURE — 0001A: CPT | Performed by: NURSE PRACTITIONER

## 2021-09-24 ENCOUNTER — APPOINTMENT (OUTPATIENT)
Dept: VACCINE CLINIC | Facility: HOSPITAL | Age: 51
End: 2021-09-24

## 2021-10-01 ENCOUNTER — IMMUNIZATION (OUTPATIENT)
Dept: VACCINE CLINIC | Facility: HOSPITAL | Age: 51
End: 2021-10-01

## 2021-10-01 PROCEDURE — 0002A: CPT | Performed by: SURGERY

## 2021-10-01 PROCEDURE — 91300 HC SARSCOV02 VAC 30MCG/0.3ML IM: CPT | Performed by: SURGERY

## 2021-10-14 RX ORDER — OMEPRAZOLE 40 MG/1
40 CAPSULE, DELAYED RELEASE ORAL DAILY
Qty: 90 CAPSULE | Refills: 3 | Status: SHIPPED | OUTPATIENT
Start: 2021-10-14 | End: 2023-01-03 | Stop reason: SDUPTHER

## 2023-01-03 RX ORDER — OMEPRAZOLE 40 MG/1
40 CAPSULE, DELAYED RELEASE ORAL DAILY
Qty: 90 CAPSULE | Refills: 3 | Status: SHIPPED | OUTPATIENT
Start: 2023-01-03

## 2023-02-10 ENCOUNTER — OFFICE VISIT (OUTPATIENT)
Dept: FAMILY MEDICINE CLINIC | Facility: CLINIC | Age: 53
End: 2023-02-10
Payer: COMMERCIAL

## 2023-02-10 VITALS
SYSTOLIC BLOOD PRESSURE: 131 MMHG | OXYGEN SATURATION: 96 % | HEIGHT: 74 IN | BODY MASS INDEX: 28.49 KG/M2 | TEMPERATURE: 97.3 F | DIASTOLIC BLOOD PRESSURE: 86 MMHG | WEIGHT: 222 LBS | HEART RATE: 87 BPM

## 2023-02-10 DIAGNOSIS — J40 BRONCHITIS: Primary | ICD-10-CM

## 2023-02-10 PROCEDURE — 99213 OFFICE O/P EST LOW 20 MIN: CPT | Performed by: NURSE PRACTITIONER

## 2023-02-10 RX ORDER — METHYLPREDNISOLONE 4 MG/1
TABLET ORAL
Qty: 21 EACH | Refills: 0 | Status: SHIPPED | OUTPATIENT
Start: 2023-02-10

## 2023-02-10 RX ORDER — AZITHROMYCIN 250 MG/1
TABLET, FILM COATED ORAL
Qty: 6 TABLET | Refills: 0 | Status: SHIPPED | OUTPATIENT
Start: 2023-02-10

## 2023-02-10 NOTE — PROGRESS NOTES
Subjective   Ambrose Sherman is a 52 y.o. male. URI    History of Present Illness   Patient presents today for possible URI. He says symptoms started around 2-3 weeks ago. Symptoms include cough and congestion. Cough is productive. He says he has been working around a lot of dust. Denies any chest pain or shortness of breath.     The following portions of the patient's history were reviewed and updated as appropriate: allergies, current medications, past family history, past medical history, past social history, past surgical history, and problem list.    Review of Systems   Constitutional: Negative for chills, fatigue and fever.   HENT: Positive for congestion. Negative for ear pain, rhinorrhea and sore throat.    Eyes: Negative for blurred vision, double vision and visual disturbance.   Respiratory: Positive for cough. Negative for chest tightness, shortness of breath and wheezing.    Cardiovascular: Negative for chest pain, palpitations and leg swelling.   Gastrointestinal: Negative for abdominal pain, diarrhea, nausea and vomiting.   Endocrine: Negative for cold intolerance and heat intolerance.   Genitourinary: Negative for difficulty urinating, dysuria, frequency and hematuria.   Musculoskeletal: Negative for arthralgias, back pain, neck pain and neck stiffness.   Skin: Negative for dry skin, pallor, rash, skin lesions and wound.   Allergic/Immunologic: Negative for environmental allergies, food allergies and immunocompromised state.   Neurological: Negative for dizziness, syncope, weakness, light-headedness, headache and confusion.   Hematological: Negative for adenopathy. Does not bruise/bleed easily.   Psychiatric/Behavioral: Negative for self-injury, sleep disturbance, suicidal ideas, depressed mood and stress. The patient is not nervous/anxious.        Vitals:    02/10/23 1435   BP: 131/86   Pulse: 87   Temp: 97.3 °F (36.3 °C)   SpO2: 96%     Body mass index is 28.49 kg/m².    Objective   Physical  Exam  Vitals and nursing note reviewed.   Constitutional:       General: He is not in acute distress.     Appearance: He is well-developed. He is not ill-appearing.   HENT:      Head: Normocephalic.      Right Ear: Hearing, tympanic membrane, ear canal and external ear normal.      Left Ear: Hearing, tympanic membrane, ear canal and external ear normal.      Nose: Nose normal.      Mouth/Throat:      Lips: Pink.      Mouth: Mucous membranes are moist.      Pharynx: Oropharynx is clear. Uvula midline. No oropharyngeal exudate.   Eyes:      General: Lids are normal. Gaze aligned appropriately.      Conjunctiva/sclera: Conjunctivae normal.      Pupils: Pupils are equal, round, and reactive to light.   Neck:      Thyroid: No thyroid mass, thyromegaly or thyroid tenderness.   Cardiovascular:      Rate and Rhythm: Normal rate and regular rhythm.      Heart sounds: Normal heart sounds, S1 normal and S2 normal. No murmur heard.  Pulmonary:      Effort: Pulmonary effort is normal.      Breath sounds: Normal breath sounds and air entry. No decreased breath sounds, wheezing, rhonchi or rales.   Abdominal:      General: Abdomen is flat. Bowel sounds are normal.      Palpations: Abdomen is soft.      Tenderness: There is no abdominal tenderness.   Musculoskeletal:         General: Normal range of motion.      Cervical back: Full passive range of motion without pain, normal range of motion and neck supple.   Lymphadenopathy:      Cervical: No cervical adenopathy.   Skin:     General: Skin is warm and dry.   Neurological:      General: No focal deficit present.      Mental Status: He is alert and oriented to person, place, and time.      Coordination: Coordination is intact.      Gait: Gait is intact.   Psychiatric:         Attention and Perception: Attention normal.         Mood and Affect: Mood normal.         Speech: Speech normal.         Behavior: Behavior normal. Behavior is cooperative.         Thought Content: Thought  content normal.         Cognition and Memory: Cognition normal.         Judgment: Judgment normal.           Assessment & Plan   Diagnoses and all orders for this visit:    1. Bronchitis (Primary)  -     methylPREDNISolone (MEDROL) 4 MG dose pack; Take as directed on package instructions.  Dispense: 21 each; Refill: 0  -     azithromycin (Zithromax Z-Sergio) 250 MG tablet; Take 2 tablets the first day, then 1 tablet daily for 4 days.  Dispense: 6 tablet; Refill: 0      Bronchitis- Pt educated that this is of viral origin 90% of the time so no antibiotics are necessary. However symptoms have been present for 2-3 weeks so I feel antibiotic is warranted. Its important to rest and stay well hydrated. When coughing, cough in the bend of arm to avoid spreading to others to others and wash hands often. Usually duration of illness is 1-3 weeks but cough may persist longer.  Take azithromycin as directed.  Take medrol sergio as directed.    If symptoms do not improve or worsen, patient was instructed to return to clinic for further evaluation.         This document has been electronically signed by VICKIE Bates on  February 10, 2023 15:11 CST

## 2023-06-08 ENCOUNTER — LAB (OUTPATIENT)
Dept: LAB | Facility: HOSPITAL | Age: 53
End: 2023-06-08
Payer: COMMERCIAL

## 2023-06-08 ENCOUNTER — OFFICE VISIT (OUTPATIENT)
Dept: FAMILY MEDICINE CLINIC | Facility: CLINIC | Age: 53
End: 2023-06-08
Payer: COMMERCIAL

## 2023-06-08 VITALS
OXYGEN SATURATION: 97 % | HEART RATE: 82 BPM | SYSTOLIC BLOOD PRESSURE: 128 MMHG | TEMPERATURE: 98 F | BODY MASS INDEX: 26.56 KG/M2 | HEIGHT: 74 IN | DIASTOLIC BLOOD PRESSURE: 83 MMHG | WEIGHT: 207 LBS

## 2023-06-08 DIAGNOSIS — R53.83 OTHER FATIGUE: ICD-10-CM

## 2023-06-08 DIAGNOSIS — Z00.00 ANNUAL PHYSICAL EXAM: Primary | ICD-10-CM

## 2023-06-08 DIAGNOSIS — Z12.5 SCREENING FOR MALIGNANT NEOPLASM OF PROSTATE: ICD-10-CM

## 2023-06-08 PROCEDURE — 99396 PREV VISIT EST AGE 40-64: CPT | Performed by: FAMILY MEDICINE

## 2023-06-08 PROCEDURE — G0103 PSA SCREENING: HCPCS | Performed by: FAMILY MEDICINE

## 2023-06-08 PROCEDURE — 80061 LIPID PANEL: CPT | Performed by: FAMILY MEDICINE

## 2023-06-08 PROCEDURE — 80050 GENERAL HEALTH PANEL: CPT | Performed by: FAMILY MEDICINE

## 2023-06-08 NOTE — PROGRESS NOTES
" Subjective   Ambrose Sherman is a 53 y.o. male.     Chief Complaint   Patient presents with    office visit     Not feeling good     Fatigue         ANNUAL EXAM AND FATIGUE    History of Present Illness     Increasing fatigue over couple of weeks.  No fever, no rashes.  Tick bites but checks daily. He had a cold a cold a few months ago and felt fine afterwards.  Says he sleeps ok.  Denies being sad or depressed. No chills, diaphoresis when sleeping. Continues to smoke.     Review of Systems   Constitutional:  Positive for fatigue. Negative for chills and fever.   HENT:  Negative for congestion, ear discharge, ear pain, facial swelling, hearing loss, postnasal drip, rhinorrhea, sinus pressure, sore throat, trouble swallowing and voice change.    Eyes:  Negative for discharge, redness and visual disturbance.   Respiratory:  Negative for cough, chest tightness, shortness of breath and wheezing.    Cardiovascular:  Negative for chest pain and palpitations.   Gastrointestinal:  Negative for abdominal pain, blood in stool, constipation, diarrhea, nausea and vomiting.   Endocrine: Negative for polydipsia and polyuria.   Genitourinary:  Negative for dysuria, flank pain, hematuria and urgency.   Musculoskeletal:  Negative for arthralgias, back pain, joint swelling and myalgias.   Skin:  Negative for rash.   Neurological:  Negative for dizziness, weakness, numbness and headaches.   Hematological:  Negative for adenopathy.   Psychiatric/Behavioral:  Negative for confusion and sleep disturbance. The patient is not nervous/anxious.          /83 (BP Location: Left arm)   Pulse 82   Temp 98 °F (36.7 °C)   Ht 188 cm (74.02\")   Wt 93.9 kg (207 lb)   SpO2 97%   BMI 26.56 kg/m²       Objective     Physical Exam  Vitals and nursing note reviewed.   Constitutional:       Appearance: Normal appearance. He is well-developed.   HENT:      Head: Normocephalic and atraumatic.      Right Ear: External ear normal.      Left Ear: " External ear normal.      Nose: Nose normal. No rhinorrhea.   Eyes:      General: No scleral icterus.     Extraocular Movements: Extraocular movements intact.      Conjunctiva/sclera: Conjunctivae normal.      Pupils: Pupils are equal, round, and reactive to light.   Cardiovascular:      Rate and Rhythm: Normal rate and regular rhythm.      Heart sounds: Normal heart sounds.     No friction rub. No gallop.   Pulmonary:      Effort: Pulmonary effort is normal.      Comments: Diminished but clear  Abdominal:      General: Bowel sounds are normal. There is no distension.      Palpations: Abdomen is soft.      Tenderness: There is no abdominal tenderness.   Musculoskeletal:         General: No deformity. Normal range of motion.      Cervical back: Normal range of motion and neck supple.   Skin:     General: Skin is warm and dry.      Findings: No erythema or rash.   Neurological:      Mental Status: He is alert and oriented to person, place, and time.      Cranial Nerves: No cranial nerve deficit.   Psychiatric:         Behavior: Behavior normal.         Thought Content: Thought content normal.         Judgment: Judgment normal.           PAST MEDICAL HISTORY     Past Medical History:   Diagnosis Date    Chest pain     GERD (gastroesophageal reflux disease)     Heavy tobacco smoker     Screening for hyperlipidemia       PAST SURGICAL HISTORY     Past Surgical History:   Procedure Laterality Date    KIDNEY STONE SURGERY        SOCIAL HISTORY     Social History     Socioeconomic History    Marital status:    Tobacco Use    Smoking status: Every Day     Packs/day: 1.50     Years: 30.00     Pack years: 45.00     Types: Cigarettes     Start date: 1991     Passive exposure: Current    Smokeless tobacco: Never   Substance and Sexual Activity    Alcohol use: No    Drug use: No    Sexual activity: Defer      ALLERGIES   Patient has no known allergies.   MEDICATIONS     Current Outpatient Medications   Medication Sig  Dispense Refill    aspirin 325 MG tablet Take 1 tablet by mouth Every Night.      omeprazole (priLOSEC) 40 MG capsule Take 1 capsule by mouth Daily. 90 capsule 3     No current facility-administered medications for this visit.        The following portions of the patient's history were reviewed and updated as appropriate: allergies, current medications, past family history, past medical history, past social history, past surgical history and problem list.        Assessment & Plan   Diagnoses and all orders for this visit:    1. Annual physical exam (Primary)  -     CBC & Differential  -     Comprehensive Metabolic Panel  -     PSA Screen  -     TSH  -     Lipid Panel    2. Screening for malignant neoplasm of prostate  -     PSA Screen    3. Other fatigue  -     CBC & Differential  -     Comprehensive Metabolic Panel  -     TSH    Stool fit test given    COUNSELED GOOD REST, PROSTATE CANCER SCREENING, COLON CANCER SCREENING, AND SMOKING CESSATION     Will call with results.     Needs better rest.                        No follow-ups on file.                  This document has been electronically signed by Brian Quintanilla MD on June 8, 2023 14:45 CDT

## 2023-06-08 NOTE — LETTER
June 8, 2023     Patient: Ambrose Sherman   YOB: 1970   Date of Visit: 6/8/2023       To Whom It May Concern:    It is my medical opinion that Ambrose Sherman may return to work 6/12/2023.        Brian Quintanilla MD    CC: No Recipients

## 2023-06-09 LAB
ALBUMIN SERPL-MCNC: 4.4 G/DL (ref 3.5–5.2)
ALBUMIN/GLOB SERPL: 1.7 G/DL
ALP SERPL-CCNC: 62 U/L (ref 39–117)
ALT SERPL W P-5'-P-CCNC: 18 U/L (ref 1–41)
ANION GAP SERPL CALCULATED.3IONS-SCNC: 12.3 MMOL/L (ref 5–15)
AST SERPL-CCNC: 15 U/L (ref 1–40)
BASOPHILS # BLD AUTO: 0.05 10*3/MM3 (ref 0–0.2)
BASOPHILS NFR BLD AUTO: 0.7 % (ref 0–1.5)
BILIRUB SERPL-MCNC: 0.5 MG/DL (ref 0–1.2)
BUN SERPL-MCNC: 12 MG/DL (ref 6–20)
BUN/CREAT SERPL: 11.3 (ref 7–25)
CALCIUM SPEC-SCNC: 9.8 MG/DL (ref 8.6–10.5)
CHLORIDE SERPL-SCNC: 103 MMOL/L (ref 98–107)
CHOLEST SERPL-MCNC: 155 MG/DL (ref 0–200)
CO2 SERPL-SCNC: 24.7 MMOL/L (ref 22–29)
CREAT SERPL-MCNC: 1.06 MG/DL (ref 0.76–1.27)
DEPRECATED RDW RBC AUTO: 38.4 FL (ref 37–54)
EGFRCR SERPLBLD CKD-EPI 2021: 84.4 ML/MIN/1.73
EOSINOPHIL # BLD AUTO: 0.13 10*3/MM3 (ref 0–0.4)
EOSINOPHIL NFR BLD AUTO: 1.8 % (ref 0.3–6.2)
ERYTHROCYTE [DISTWIDTH] IN BLOOD BY AUTOMATED COUNT: 13 % (ref 12.3–15.4)
GLOBULIN UR ELPH-MCNC: 2.6 GM/DL
GLUCOSE SERPL-MCNC: 107 MG/DL (ref 65–99)
HCT VFR BLD AUTO: 48.9 % (ref 37.5–51)
HDLC SERPL-MCNC: 29 MG/DL (ref 40–60)
HGB BLD-MCNC: 16.9 G/DL (ref 13–17.7)
IMM GRANULOCYTES # BLD AUTO: 0.03 10*3/MM3 (ref 0–0.05)
IMM GRANULOCYTES NFR BLD AUTO: 0.4 % (ref 0–0.5)
LDLC SERPL CALC-MCNC: 109 MG/DL (ref 0–100)
LDLC/HDLC SERPL: 3.71 {RATIO}
LYMPHOCYTES # BLD AUTO: 1.68 10*3/MM3 (ref 0.7–3.1)
LYMPHOCYTES NFR BLD AUTO: 22.6 % (ref 19.6–45.3)
MCH RBC QN AUTO: 28.5 PG (ref 26.6–33)
MCHC RBC AUTO-ENTMCNC: 34.6 G/DL (ref 31.5–35.7)
MCV RBC AUTO: 82.3 FL (ref 79–97)
MONOCYTES # BLD AUTO: 0.57 10*3/MM3 (ref 0.1–0.9)
MONOCYTES NFR BLD AUTO: 7.7 % (ref 5–12)
NEUTROPHILS NFR BLD AUTO: 4.96 10*3/MM3 (ref 1.7–7)
NEUTROPHILS NFR BLD AUTO: 66.8 % (ref 42.7–76)
NRBC BLD AUTO-RTO: 0 /100 WBC (ref 0–0.2)
PLATELET # BLD AUTO: 209 10*3/MM3 (ref 140–450)
PMV BLD AUTO: 11.5 FL (ref 6–12)
POTASSIUM SERPL-SCNC: 5 MMOL/L (ref 3.5–5.2)
PROT SERPL-MCNC: 7 G/DL (ref 6–8.5)
PSA SERPL-MCNC: 1.54 NG/ML (ref 0–4)
RBC # BLD AUTO: 5.94 10*6/MM3 (ref 4.14–5.8)
SODIUM SERPL-SCNC: 140 MMOL/L (ref 136–145)
TRIGL SERPL-MCNC: 92 MG/DL (ref 0–150)
TSH SERPL DL<=0.05 MIU/L-ACNC: 1.67 UIU/ML (ref 0.27–4.2)
VLDLC SERPL-MCNC: 17 MG/DL (ref 5–40)
WBC NRBC COR # BLD: 7.42 10*3/MM3 (ref 3.4–10.8)